# Patient Record
Sex: FEMALE | Race: WHITE
[De-identification: names, ages, dates, MRNs, and addresses within clinical notes are randomized per-mention and may not be internally consistent; named-entity substitution may affect disease eponyms.]

---

## 2017-07-19 ENCOUNTER — RESULT REVIEW (OUTPATIENT)
Age: 57
End: 2017-07-19

## 2021-01-13 ENCOUNTER — APPOINTMENT (OUTPATIENT)
Dept: ORTHOPEDIC SURGERY | Facility: CLINIC | Age: 61
End: 2021-01-13
Payer: COMMERCIAL

## 2021-01-13 VITALS
SYSTOLIC BLOOD PRESSURE: 134 MMHG | DIASTOLIC BLOOD PRESSURE: 91 MMHG | WEIGHT: 116 LBS | BODY MASS INDEX: 19.33 KG/M2 | HEART RATE: 98 BPM | HEIGHT: 65 IN

## 2021-01-13 DIAGNOSIS — M25.562 PAIN IN RIGHT KNEE: ICD-10-CM

## 2021-01-13 DIAGNOSIS — M25.561 PAIN IN RIGHT KNEE: ICD-10-CM

## 2021-01-13 DIAGNOSIS — G89.29 PAIN IN RIGHT KNEE: ICD-10-CM

## 2021-01-13 PROCEDURE — 99203 OFFICE O/P NEW LOW 30 MIN: CPT

## 2021-01-13 PROCEDURE — 99072 ADDL SUPL MATRL&STAF TM PHE: CPT

## 2021-01-13 PROCEDURE — 73564 X-RAY EXAM KNEE 4 OR MORE: CPT | Mod: 50

## 2021-01-13 NOTE — HISTORY OF PRESENT ILLNESS
[de-identified] : 59yo female presents complaining of bilateral knee pain left worse than right for 3 months. No acute injuries. She states she woke up one morning with severe pain unable to walk.  Overall today feeling better. This is worse with using stairs. No injuries. No swelling. Denies numbness tingling\par \par The patient's past medical history, past surgical history, medications, allergies, and social history were reviewed by me today with the patient and documented accordingly. In addition, the patient's family history, which is noncontributory to this visit, was also reviewed.\par

## 2021-01-13 NOTE — PHYSICAL EXAM
[de-identified] : General Exam\par \par Well developed, well nourished\par No apparent distress\par Oriented to person, place, and time\par Mood: Normal\par Affect: Normal\par Balance and coordination: Normal\par Gait: Normal\par \par left knee exam\par \par Skin: Clean, dry, intact\par Inspection: No obvious malalignment, no masses, no swelling, mild effusion.\par Tenderness: no MJLT. No LJLT. No tenderness over the medial and lateral patella facets. No ttp medial/lateral epicondyle, patella tendon, tibial tubercle, pes anserinus, or proximal fibula.\par ROM: 0 to 130° no pain with deep flexion in both knees\par Stability: Stable to varus, valgus, lachman testing. Negative anterior/posterior drawer.\par Additional tests: Negative McMurrays test, Negative patellar grind test. \par Strength: 5/5 Q/H/TA/GS/EHL, no atrophy\par Neuro: In tact to light touch throughout in dp/sp/tib/chanel/saph nerve districutions, DTR's normal\par Pulses: 2+ DP/PT pulses.\par \par right knee exam\par \par Skin: Clean, dry, intact\par Inspection: No obvious malalignment, no masses, no swelling, mild effusion.\par Tenderness: no MJLT. No LJLT. No tenderness over the medial and lateral patella facets. No ttp medial/lateral epicondyle, patella tendon, tibial tubercle, pes anserinus, or proximal fibula.\par ROM: 0 to 130° no pain with deep flexion in both knees\par Stability: Stable to varus, valgus, lachman testing. Negative anterior/posterior drawer.\par Additional tests: Negative McMurrays test, Negative patellar grind test. \par Strength: 5/5 Q/H/TA/GS/EHL, no atrophy\par Neuro: In tact to light touch throughout in dp/sp/tib/chanel/saph nerve districutions, DTR's normal\par Pulses: 2+ DP/PT pulses.\par  [de-identified] : \par The following radiographs were ordered and read by me during this patients visit. I reviewed each radiograph in detail with the patient and discussed the findings as highlighted below. \par \par 4 views of both knees were obtained today. There is mild arthritic changes joint space is maintained\par \par

## 2021-01-13 NOTE — DISCUSSION/SUMMARY
[de-identified] : 60-year-old female with mild arthritis of both knees.I discussed the treatment of degenerative arthritis with the patient at length today. I described the spectrum of treatment from nonoperative modalities to total joint arthroplasty. Noninvasive and nonoperative treatment modalities include weight reduction, activity modification with low impact exercise, PRN use of acetaminophen or anti-inflammatory medication if tolerated, glucosamine/chondroitin supplements, and physical therapy. Further treatments can include corticosteroid injection and the use of hyaluronic acid injections. Definitive treatment can certainly include total joint arthroplasty also. The risks and benefits of each treatment options was discussed and all questions were answered. She is unable to take anti-inflammatory medications given her severe pain she is requesting MRIs of time to further evaluate for intra-articular derangement all questions answered all of her MRI. Tylenol for pain. Ice for swelling

## 2021-03-19 ENCOUNTER — RESULT REVIEW (OUTPATIENT)
Age: 61
End: 2021-03-19

## 2021-10-05 ENCOUNTER — RESULT REVIEW (OUTPATIENT)
Age: 61
End: 2021-10-05

## 2022-01-25 ENCOUNTER — APPOINTMENT (OUTPATIENT)
Dept: ENDOCRINOLOGY | Facility: CLINIC | Age: 62
End: 2022-01-25
Payer: COMMERCIAL

## 2022-01-25 ENCOUNTER — TRANSCRIPTION ENCOUNTER (OUTPATIENT)
Age: 62
End: 2022-01-25

## 2022-01-25 ENCOUNTER — LABORATORY RESULT (OUTPATIENT)
Age: 62
End: 2022-01-25

## 2022-01-25 VITALS
HEART RATE: 87 BPM | DIASTOLIC BLOOD PRESSURE: 84 MMHG | WEIGHT: 112 LBS | SYSTOLIC BLOOD PRESSURE: 123 MMHG | OXYGEN SATURATION: 93 % | HEIGHT: 65 IN | BODY MASS INDEX: 18.66 KG/M2 | TEMPERATURE: 98.1 F

## 2022-01-25 DIAGNOSIS — Z83.3 FAMILY HISTORY OF DIABETES MELLITUS: ICD-10-CM

## 2022-01-25 DIAGNOSIS — Z83.49 FAMILY HISTORY OF OTHER ENDOCRINE, NUTRITIONAL AND METABOLIC DISEASES: ICD-10-CM

## 2022-01-25 DIAGNOSIS — M54.12 RADICULOPATHY, CERVICAL REGION: ICD-10-CM

## 2022-01-25 DIAGNOSIS — Z87.891 PERSONAL HISTORY OF NICOTINE DEPENDENCE: ICD-10-CM

## 2022-01-25 DIAGNOSIS — Z86.59 PERSONAL HISTORY OF OTHER MENTAL AND BEHAVIORAL DISORDERS: ICD-10-CM

## 2022-01-25 DIAGNOSIS — Z82.3 FAMILY HISTORY OF STROKE: ICD-10-CM

## 2022-01-25 DIAGNOSIS — Z87.39 PERSONAL HISTORY OF OTHER DISEASES OF THE MUSCULOSKELETAL SYSTEM AND CONNECTIVE TISSUE: ICD-10-CM

## 2022-01-25 LAB — HBA1C MFR BLD HPLC: 7.1

## 2022-01-25 PROCEDURE — 99205 OFFICE O/P NEW HI 60 MIN: CPT | Mod: 25

## 2022-01-25 PROCEDURE — 36415 COLL VENOUS BLD VENIPUNCTURE: CPT

## 2022-01-25 PROCEDURE — 83036 HEMOGLOBIN GLYCOSYLATED A1C: CPT | Mod: QW

## 2022-01-25 RX ORDER — DULOXETINE HYDROCHLORIDE 30 MG/1
30 CAPSULE, DELAYED RELEASE ORAL
Refills: 0 | Status: ACTIVE | COMMUNITY
Start: 2022-01-25

## 2022-01-25 NOTE — CONSULT LETTER
[Dear  ___] : Dear  [unfilled], [Consult Letter:] : I had the pleasure of evaluating your patient, [unfilled]. [( Thank you for referring [unfilled] for consultation for _____ )] : Thank you for referring [unfilled] for consultation for [unfilled] [Please see my note below.] : Please see my note below. [Consult Closing:] : Thank you very much for allowing me to participate in the care of this patient.  If you have any questions, please do not hesitate to contact me. [Sincerely,] : Sincerely, [FreeTextEntry2] : Alesha Flores MD\par [address]\[place] [address]\par  [FreeTextEntry3] : Emerson Cortés DO\par  of Medicine\par [facility] [employer] [facility]\par

## 2022-01-25 NOTE — ASSESSMENT
[Diabetes Foot Care] : diabetes foot care [Long Term Vascular Complications] : long term vascular complications of diabetes [Carbohydrate Consistent Diet] : carbohydrate consistent diet [Importance of Diet and Exercise] : importance of diet and exercise to improve glycemic control, achieve weight loss and improve cardiovascular health [Self Monitoring of Blood Glucose] : self monitoring of blood glucose [FreeTextEntry1] : 61 y.o. female with h/o Type 2 DM, hyperlipidemia and fatigue.\par \par 1. Type 2 DM- Good control with Hba1c of 7.1% today. Discussed pathophysiology. Reviewed blood glucose and Hba1c goals. Encouraged a carbohydrate consistent diet and exercise. She is not interested in meeting with a RD/CDE. Dietary literature was provided to the patient. For now, will continue Metformin 1,000 mg BID. Discussed adding a GLP-1 agonist or a SGLT-2 inhibitor if diabetes control worsens. Will evaluate for Type 1 DM with a c peptide, anti-FRED, islet antibody and zinc transporter ab. Discussed importance of yearly eye exam and foot care. Will check CMP and urine microalb/cr ratio.\par \par 2. BP is at goal and will monitor for now\par \par 3. Hyperlipidemia- Encouraged a low fat diet and exercise. Will check lipids. Will continue Rosuvastatin 5 mg daily.\par \par 4. Fatigue- Will check CBC, iron studies, TFTs, vitamin B12 with folate and Lyme titer\par \par \par Follow up in 3 to 4 months\par Labs drawn in the office

## 2022-01-25 NOTE — HISTORY OF PRESENT ILLNESS
[FreeTextEntry1] : 61 y.o. female with h/o Type 2 DM diagnosed in 2015 presents for management. She was following with endocrine, Dr. Clark.\par \par She does have a One Touch glucometer but not checking blood glucose levels at home. UTD with ophthalmology (goes yearly) and no retinopathy. No kidney disease. No neuropathy and did see neurology in the past. Self care of feet but does have podiatrist in case of emergency. \par \par Currently taking Metformin 1,000 mg BID. Never met with RD. \par \par Diet:\par Breakfast: yogurt\par Lunch: fruit\par Dinner: starch, protein with salad\par Drinks: water, tea\par Snacks: ice cream sandwich\par \par No exercise but trying to start using treadmill\par \par Also has bulging cervical disc with decrease in balance. \par \par Takes MVI, vitamin C ,magnesium and vitamin B12. \par \par Does c/o fatigue. Weight is stable. No polyuria or polydipsia. No SOB or CP. \par \par In regards to hyperlipidemia, she takes Rosuvastatin 5 mg daily. \par \par She does have family history of autoimmune disease including her sister with Type 1 DM and thyroid disease.

## 2022-01-25 NOTE — PHYSICAL EXAM
[Alert] : alert [No Acute Distress] : no acute distress [Normal Sclera/Conjunctiva] : normal sclera/conjunctiva [EOMI] : extra ocular movement intact [No LAD] : no lymphadenopathy [Thyroid Not Enlarged] : the thyroid was not enlarged [No Thyroid Nodules] : no palpable thyroid nodules [No Respiratory Distress] : no respiratory distress [Clear to Auscultation] : lungs were clear to auscultation bilaterally [Normal S1, S2] : normal S1 and S2 [Regular Rhythm] : with a regular rhythm [No Edema] : no peripheral edema [Normal Bowel Sounds] : normal bowel sounds [Not Tender] : non-tender [Not Distended] : not distended [Soft] : abdomen soft [Normal Anterior Cervical Nodes] : no anterior cervical lymphadenopathy [No Clubbing, Cyanosis] : no clubbing  or cyanosis of the fingernails [No Rash] : no rash [Right foot was examined, including] : right foot ~C was examined, including visual inspection with sensory and pulse exams [Left foot was examined, including] : left foot ~C was examined, including visual inspection with sensory and pulse exams [Normal] : normal [2+] : 2+ in the dorsalis pedis [Normal Reflexes] : deep tendon reflexes were 2+ and symmetric [Normal Affect] : the affect was normal [Normal Mood] : the mood was normal [No Stigmata of Cushings Syndrome] : no stigmata of Cushings Syndrome [Acanthosis Nigricans] : no acanthosis nigricans [Diminished Throughout Both Feet] : normal tactile sensation with monofilament testing throughout both feet [FreeTextEntry1] : bunion [FreeTextEntry5] : bunion

## 2022-01-25 NOTE — REVIEW OF SYSTEMS
[Fatigue] : fatigue [Back Pain] : back pain [Dry Skin] : dry skin [Poor Balance] : poor balance [Negative] : Genitourinary [Hair Loss] : no hair loss [Pain/Numbness of Digits] : no pain/numbness of digits [Polydipsia] : no polydipsia [Cold Intolerance] : no cold intolerance [Heat Intolerance] : no heat intolerance [Easy Bruising] : no tendency for easy bruising [Swelling] : no swelling [FreeTextEntry7] : BMs are variable and UTD with GI

## 2022-01-26 LAB
25(OH)D3 SERPL-MCNC: 53.8 NG/ML
ALBUMIN SERPL ELPH-MCNC: 4.8 G/DL
ALP BLD-CCNC: 72 U/L
ALT SERPL-CCNC: 20 U/L
ANION GAP SERPL CALC-SCNC: 17 MMOL/L
AST SERPL-CCNC: 32 U/L
BASOPHILS # BLD AUTO: 0.05 K/UL
BASOPHILS NFR BLD AUTO: 0.9 %
BILIRUB SERPL-MCNC: 0.4 MG/DL
BUN SERPL-MCNC: 14 MG/DL
C PEPTIDE SERPL-MCNC: 2.8 NG/ML
CALCIUM SERPL-MCNC: 10.6 MG/DL
CHLORIDE SERPL-SCNC: 101 MMOL/L
CHOLEST SERPL-MCNC: 155 MG/DL
CO2 SERPL-SCNC: 20 MMOL/L
CREAT SERPL-MCNC: 0.61 MG/DL
CREAT SPEC-SCNC: 98 MG/DL
EOSINOPHIL # BLD AUTO: 0.17 K/UL
EOSINOPHIL NFR BLD AUTO: 3 %
FERRITIN SERPL-MCNC: 25 NG/ML
FOLATE SERPL-MCNC: 19.9 NG/ML
GLUCOSE SERPL-MCNC: 114 MG/DL
HCT VFR BLD CALC: 40.9 %
HDLC SERPL-MCNC: 64 MG/DL
HGB BLD-MCNC: 12.7 G/DL
IMM GRANULOCYTES NFR BLD AUTO: 0.4 %
IRON SATN MFR SERPL: 17 %
IRON SERPL-MCNC: 59 UG/DL
LDLC SERPL CALC-MCNC: 71 MG/DL
LDLC SERPL DIRECT ASSAY-MCNC: 63 MG/DL
LYMPHOCYTES # BLD AUTO: 1.49 K/UL
LYMPHOCYTES NFR BLD AUTO: 26.4 %
MAN DIFF?: NORMAL
MCHC RBC-ENTMCNC: 28.9 PG
MCHC RBC-ENTMCNC: 31.1 GM/DL
MCV RBC AUTO: 93 FL
MICROALBUMIN 24H UR DL<=1MG/L-MCNC: 2 MG/DL
MICROALBUMIN/CREAT 24H UR-RTO: 21 MG/G
MONOCYTES # BLD AUTO: 0.28 K/UL
MONOCYTES NFR BLD AUTO: 5 %
NEUTROPHILS # BLD AUTO: 3.63 K/UL
NEUTROPHILS NFR BLD AUTO: 64.3 %
NONHDLC SERPL-MCNC: 91 MG/DL
PLATELET # BLD AUTO: 312 K/UL
POTASSIUM SERPL-SCNC: 5.2 MMOL/L
PROT SERPL-MCNC: 7 G/DL
RBC # BLD: 4.4 M/UL
RBC # FLD: 13 %
SODIUM SERPL-SCNC: 139 MMOL/L
TIBC SERPL-MCNC: 350 UG/DL
TRIGL SERPL-MCNC: 98 MG/DL
TSH SERPL-ACNC: 1.07 UIU/ML
UIBC SERPL-MCNC: 291 UG/DL
VIT B12 SERPL-MCNC: >2000 PG/ML
WBC # FLD AUTO: 5.64 K/UL

## 2022-01-29 LAB — PANC ISLET CELL AB SER QL: NORMAL

## 2022-01-30 LAB — GAD65 AB SER-MCNC: 0 NMOL/L

## 2022-02-03 LAB — ZINC TRANSPORTER 8 AB: <15 U/ML

## 2022-04-04 ENCOUNTER — RESULT REVIEW (OUTPATIENT)
Age: 62
End: 2022-04-04

## 2022-05-17 ENCOUNTER — APPOINTMENT (OUTPATIENT)
Dept: ENDOCRINOLOGY | Facility: CLINIC | Age: 62
End: 2022-05-17

## 2022-06-13 ENCOUNTER — APPOINTMENT (OUTPATIENT)
Dept: ENDOCRINOLOGY | Facility: CLINIC | Age: 62
End: 2022-06-13
Payer: COMMERCIAL

## 2022-06-13 VITALS
TEMPERATURE: 97.7 F | DIASTOLIC BLOOD PRESSURE: 83 MMHG | BODY MASS INDEX: 18.33 KG/M2 | SYSTOLIC BLOOD PRESSURE: 117 MMHG | HEART RATE: 84 BPM | OXYGEN SATURATION: 98 % | HEIGHT: 65 IN | WEIGHT: 110 LBS

## 2022-06-13 LAB — HBA1C MFR BLD HPLC: 7.2

## 2022-06-13 PROCEDURE — 83036 HEMOGLOBIN GLYCOSYLATED A1C: CPT | Mod: QW

## 2022-06-13 PROCEDURE — 99214 OFFICE O/P EST MOD 30 MIN: CPT | Mod: 25

## 2022-06-13 NOTE — ASSESSMENT
[Diabetes Foot Care] : diabetes foot care [Long Term Vascular Complications] : long term vascular complications of diabetes [Carbohydrate Consistent Diet] : carbohydrate consistent diet [Importance of Diet and Exercise] : importance of diet and exercise to improve glycemic control, achieve weight loss and improve cardiovascular health [Self Monitoring of Blood Glucose] : self monitoring of blood glucose [FreeTextEntry1] : 61 y.o. female with h/o Type 2 DM, hyperlipidemia and fatigue.\par \par 1. Type 2 DM- Good control with Hba1c of 7.2% today. Discussed pathophysiology. Reviewed blood glucose and Hba1c goals. Encouraged a carbohydrate consistent diet and exercise. She is not interested in meeting with a RD/CDE. For now, will continue Metformin 1,000 mg BID. Discussed adding a GLP-1 agonist or a SGLT-2 inhibitor if diabetes control worsens. Did evaluate for Type 1 DM in 1/2022 with a normal c peptide, anti-FRED, islet antibody and zinc transporter ab. Discussed importance of yearly eye exam and foot care. Normal CMP and urine alb/cr ratio in 1/2022\par \par 2. BP is at goal and will monitor for now\par \par 3. Hyperlipidemia- Encouraged a low fat diet and exercise. Lipids are at goal. Will continue Rosuvastatin 5 mg daily.\par \par 4. Fatigue- Normal CBC, iron studies, TFTs, vitamin B12 with folate and Lyme titer in 1/2022. May be related to sleep. \par \par \par Follow up in 4 to 6 months

## 2022-06-13 NOTE — PHYSICAL EXAM
[Alert] : alert [No Acute Distress] : no acute distress [Normal Sclera/Conjunctiva] : normal sclera/conjunctiva [EOMI] : extra ocular movement intact [No LAD] : no lymphadenopathy [Thyroid Not Enlarged] : the thyroid was not enlarged [No Thyroid Nodules] : no palpable thyroid nodules [No Respiratory Distress] : no respiratory distress [Clear to Auscultation] : lungs were clear to auscultation bilaterally [Normal S1, S2] : normal S1 and S2 [Regular Rhythm] : with a regular rhythm [No Edema] : no peripheral edema [Normal Bowel Sounds] : normal bowel sounds [Not Tender] : non-tender [Not Distended] : not distended [Soft] : abdomen soft [Normal Anterior Cervical Nodes] : no anterior cervical lymphadenopathy [No Stigmata of Cushings Syndrome] : no stigmata of Cushings Syndrome [No Clubbing, Cyanosis] : no clubbing  or cyanosis of the fingernails [No Rash] : no rash [Right foot was examined, including] : right foot ~C was examined, including visual inspection with sensory and pulse exams [Left foot was examined, including] : left foot ~C was examined, including visual inspection with sensory and pulse exams [Normal] : normal [2+] : 2+ in the dorsalis pedis [Normal Reflexes] : deep tendon reflexes were 2+ and symmetric [Normal Affect] : the affect was normal [Normal Mood] : the mood was normal [Acanthosis Nigricans] : no acanthosis nigricans [Diminished Throughout Both Feet] : normal tactile sensation with monofilament testing throughout both feet [FreeTextEntry1] : bunion [FreeTextEntry5] : bunion

## 2022-06-13 NOTE — REVIEW OF SYSTEMS
[Fatigue] : fatigue [Back Pain] : back pain [Poor Balance] : poor balance [Negative] : Genitourinary [Recent Weight Gain (___ Lbs)] : no recent weight gain [Recent Weight Loss (___ Lbs)] : no recent weight loss [Hair Loss] : no hair loss [Pain/Numbness of Digits] : no pain/numbness of digits [Polydipsia] : no polydipsia [Cold Intolerance] : no cold intolerance [Heat Intolerance] : no heat intolerance [Easy Bruising] : no tendency for easy bruising [Swelling] : no swelling [FreeTextEntry7] : BMs are variable but more loose [de-identified] : less dry skin

## 2022-06-13 NOTE — HISTORY OF PRESENT ILLNESS
[FreeTextEntry1] : 61 y.o. female with h/o Type 2 DM diagnosed in 2015 presents for follow up visit. \par \par She does have a One Touch glucometer but not checking blood glucose levels at home. UTD with ophthalmology (goes yearly) and no retinopathy. No kidney disease. No neuropathy and did see neurology in the past. Self care of feet but does have podiatrist in case of emergency. \par \par Currently taking Metformin 1,000 mg BID. Never met with RD. \par \par Diet:\par Breakfast: yogurt\par Lunch: fruit\par Dinner: starch, protein with salad\par Drinks: water, tea\par Snacks: likes sweets\par \par She does use treadmill\par \par Also has bulging cervical disc with decrease in balance. \par \par Takes MVI, vitamin C ,magnesium and vitamin B12. \par \par Does c/o fatigue. Weight is stable. No polyuria or polydipsia. No SOB or CP. \par \par In regards to hyperlipidemia, she takes Rosuvastatin 5 mg daily. \par \par She does have family history of autoimmune disease including her sister with Type 1 DM and thyroid disease.

## 2022-12-12 ENCOUNTER — APPOINTMENT (OUTPATIENT)
Dept: ENDOCRINOLOGY | Facility: CLINIC | Age: 62
End: 2022-12-12

## 2022-12-12 VITALS
WEIGHT: 106 LBS | OXYGEN SATURATION: 97 % | HEIGHT: 65 IN | HEART RATE: 95 BPM | BODY MASS INDEX: 17.66 KG/M2 | SYSTOLIC BLOOD PRESSURE: 130 MMHG | DIASTOLIC BLOOD PRESSURE: 87 MMHG | RESPIRATION RATE: 20 BRPM | TEMPERATURE: 98 F

## 2022-12-12 VITALS — SYSTOLIC BLOOD PRESSURE: 110 MMHG | DIASTOLIC BLOOD PRESSURE: 70 MMHG

## 2022-12-12 LAB — HBA1C MFR BLD HPLC: 6.8

## 2022-12-12 PROCEDURE — 83036 HEMOGLOBIN GLYCOSYLATED A1C: CPT | Mod: QW

## 2022-12-12 PROCEDURE — 36415 COLL VENOUS BLD VENIPUNCTURE: CPT

## 2022-12-12 PROCEDURE — 99214 OFFICE O/P EST MOD 30 MIN: CPT | Mod: 25

## 2022-12-12 RX ORDER — AZITHROMYCIN 250 MG/1
250 TABLET, FILM COATED ORAL
Qty: 6 | Refills: 0 | Status: DISCONTINUED | COMMUNITY
Start: 2022-06-17

## 2022-12-12 NOTE — HISTORY OF PRESENT ILLNESS
[FreeTextEntry1] : 62 y.o. female with h/o Type 2 DM diagnosed in 2015 presents for follow up visit. \par \par She does have a One Touch glucometer and am FS are 160s. UTD with ophthalmology (goes yearly) and no retinopathy. No kidney disease. No neuropathy and did see neurology in the past. Self care of feet but does have podiatrist in case of emergency. \par \par Currently taking Metformin 1,000 mg BID. Never met with RD. \par \par Diet:\par Breakfast: yogurt\par Lunch: fruit\par Dinner: starch, protein with salad\par Drinks: water, tea\par Snacks: likes sweets\par \par She does use treadmill\par \par Also has bulging cervical disc with decrease in balance. \par \par Takes MVI, vitamin C ,magnesium and vitamin B12. \par \par Does c/o fatigue. Weight is down. No polyuria or polydipsia. No SOB or CP. \par \par In regards to hyperlipidemia, she takes Rosuvastatin 5 mg daily. \par \par She does have family history of autoimmune disease including her sister with Type 1 DM and thyroid disease. \par \par In regards to bone health, UTD with DEXA scan and followed by PCP. No falls or fractures. No calcium or vitamin D supplement. She does eat yogurt and some leafy greens.

## 2022-12-12 NOTE — REVIEW OF SYSTEMS
[Fatigue] : fatigue [Recent Weight Loss (___ Lbs)] : recent weight loss: [unfilled] lbs [Back Pain] : back pain [Poor Balance] : poor balance [Negative] : Genitourinary [Recent Weight Gain (___ Lbs)] : no recent weight gain [Hair Loss] : no hair loss [Pain/Numbness of Digits] : no pain/numbness of digits [Polydipsia] : no polydipsia [Cold Intolerance] : no cold intolerance [Heat Intolerance] : no heat intolerance [Easy Bruising] : no tendency for easy bruising [Swelling] : no swelling [FreeTextEntry7] : BMs are variable but more loose [de-identified] : less dry skin

## 2022-12-12 NOTE — ASSESSMENT
[Diabetes Foot Care] : diabetes foot care [Long Term Vascular Complications] : long term vascular complications of diabetes [Carbohydrate Consistent Diet] : carbohydrate consistent diet [Importance of Diet and Exercise] : importance of diet and exercise to improve glycemic control, achieve weight loss and improve cardiovascular health [Self Monitoring of Blood Glucose] : self monitoring of blood glucose [FreeTextEntry1] : 62 y.o. female with h/o Type 2 DM, hyperlipidemia and fatigue.\par \par 1. Type 2 DM- Good control with Hba1c of 6.8% today. Discussed pathophysiology. Reviewed blood glucose and Hba1c goals. Encouraged a carbohydrate consistent diet and exercise. For now, will continue Metformin 1,000 mg BID. Discussed adding a GLP-1 agonist or a SGLT-2 inhibitor if diabetes control worsens. Did evaluate for Type 1 DM in 1/2022 with a normal c peptide, anti-FRED, islet antibody and zinc transporter ab. Discussed importance of yearly eye exam and foot care. Normal CMP and urine alb/cr ratio in 1/2022 and will repeat now. \par \par 2. BP is at goal and will monitor for now\par \par 3. Hyperlipidemia- Encouraged a low fat diet and exercise. Will check lipids. Will continue Rosuvastatin 5 mg daily.\par \par 4. Fatigue- Will check CBC, iron studies, TFTs, and vitamin B12 with folate. May be related to sleep. \par \par 5. Bone health- Encouraged patient to have most recent DEXA scan forwarded to me for review. Encouraged weight bearing activity. Discussed appropriate calcium and vitamin D intake. \par \par Follow up in 4 months\par Labs drawn in the office today

## 2022-12-13 ENCOUNTER — TRANSCRIPTION ENCOUNTER (OUTPATIENT)
Age: 62
End: 2022-12-13

## 2022-12-13 LAB
25(OH)D3 SERPL-MCNC: 41.6 NG/ML
ALBUMIN SERPL ELPH-MCNC: 4.5 G/DL
ALP BLD-CCNC: 97 U/L
ALT SERPL-CCNC: 11 U/L
ANION GAP SERPL CALC-SCNC: 13 MMOL/L
AST SERPL-CCNC: 16 U/L
BASOPHILS # BLD AUTO: 0.04 K/UL
BASOPHILS NFR BLD AUTO: 0.6 %
BILIRUB SERPL-MCNC: 0.3 MG/DL
BUN SERPL-MCNC: 17 MG/DL
CALCIUM SERPL-MCNC: 10.1 MG/DL
CHLORIDE SERPL-SCNC: 102 MMOL/L
CHOLEST SERPL-MCNC: 135 MG/DL
CO2 SERPL-SCNC: 25 MMOL/L
CREAT SERPL-MCNC: 0.77 MG/DL
CREAT SPEC-SCNC: 191 MG/DL
EGFR: 88 ML/MIN/1.73M2
EOSINOPHIL # BLD AUTO: 0.2 K/UL
EOSINOPHIL NFR BLD AUTO: 3.2 %
FERRITIN SERPL-MCNC: 15 NG/ML
FOLATE SERPL-MCNC: 6.2 NG/ML
GLUCOSE SERPL-MCNC: 106 MG/DL
HCT VFR BLD CALC: 37.3 %
HDLC SERPL-MCNC: 68 MG/DL
HGB BLD-MCNC: 11.9 G/DL
IMM GRANULOCYTES NFR BLD AUTO: 0.2 %
IRON SATN MFR SERPL: 15 %
IRON SERPL-MCNC: 55 UG/DL
LDLC SERPL CALC-MCNC: 47 MG/DL
LYMPHOCYTES # BLD AUTO: 1.82 K/UL
LYMPHOCYTES NFR BLD AUTO: 28.7 %
MAN DIFF?: NORMAL
MCHC RBC-ENTMCNC: 28.2 PG
MCHC RBC-ENTMCNC: 31.9 GM/DL
MCV RBC AUTO: 88.4 FL
MICROALBUMIN 24H UR DL<=1MG/L-MCNC: 3.2 MG/DL
MICROALBUMIN/CREAT 24H UR-RTO: 17 MG/G
MONOCYTES # BLD AUTO: 0.42 K/UL
MONOCYTES NFR BLD AUTO: 6.6 %
NEUTROPHILS # BLD AUTO: 3.85 K/UL
NEUTROPHILS NFR BLD AUTO: 60.7 %
NONHDLC SERPL-MCNC: 67 MG/DL
PLATELET # BLD AUTO: 360 K/UL
POTASSIUM SERPL-SCNC: 5 MMOL/L
PROT SERPL-MCNC: 6.9 G/DL
RBC # BLD: 4.22 M/UL
RBC # FLD: 12.6 %
SODIUM SERPL-SCNC: 140 MMOL/L
TIBC SERPL-MCNC: 370 UG/DL
TRIGL SERPL-MCNC: 99 MG/DL
TSH SERPL-ACNC: 1.4 UIU/ML
UIBC SERPL-MCNC: 315 UG/DL
VIT B12 SERPL-MCNC: 1732 PG/ML
WBC # FLD AUTO: 6.34 K/UL

## 2022-12-14 ENCOUNTER — TRANSCRIPTION ENCOUNTER (OUTPATIENT)
Age: 62
End: 2022-12-14

## 2022-12-28 ENCOUNTER — TRANSCRIPTION ENCOUNTER (OUTPATIENT)
Age: 62
End: 2022-12-28

## 2023-01-12 ENCOUNTER — TRANSCRIPTION ENCOUNTER (OUTPATIENT)
Age: 63
End: 2023-01-12

## 2023-01-25 RX ORDER — ZOLEDRONIC ACID 5 MG/100ML
5 INJECTION INTRAVENOUS
Qty: 0 | Refills: 0 | Status: COMPLETED | OUTPATIENT
Start: 2023-01-25 | End: 1900-01-01

## 2023-02-08 ENCOUNTER — TRANSCRIPTION ENCOUNTER (OUTPATIENT)
Age: 63
End: 2023-02-08

## 2023-02-13 ENCOUNTER — TRANSCRIPTION ENCOUNTER (OUTPATIENT)
Age: 63
End: 2023-02-13

## 2023-02-14 ENCOUNTER — TRANSCRIPTION ENCOUNTER (OUTPATIENT)
Age: 63
End: 2023-02-14

## 2023-02-16 ENCOUNTER — TRANSCRIPTION ENCOUNTER (OUTPATIENT)
Age: 63
End: 2023-02-16

## 2023-02-16 RX ORDER — ZOLEDRONIC ACID 5 MG/100ML
5 INJECTION INTRAVENOUS
Qty: 1 | Refills: 0 | Status: ACTIVE | OUTPATIENT
Start: 2023-01-13

## 2023-02-22 ENCOUNTER — APPOINTMENT (OUTPATIENT)
Dept: RHEUMATOLOGY | Facility: CLINIC | Age: 63
End: 2023-02-22
Payer: COMMERCIAL

## 2023-02-22 VITALS
DIASTOLIC BLOOD PRESSURE: 81 MMHG | SYSTOLIC BLOOD PRESSURE: 118 MMHG | RESPIRATION RATE: 16 BRPM | OXYGEN SATURATION: 97 % | HEART RATE: 87 BPM | TEMPERATURE: 98.7 F

## 2023-02-22 VITALS
RESPIRATION RATE: 16 BRPM | SYSTOLIC BLOOD PRESSURE: 108 MMHG | OXYGEN SATURATION: 99 % | DIASTOLIC BLOOD PRESSURE: 75 MMHG | HEART RATE: 83 BPM

## 2023-02-22 PROCEDURE — 96374 THER/PROPH/DIAG INJ IV PUSH: CPT | Mod: 59

## 2023-02-22 RX ORDER — ZOLEDRONIC ACID 5 MG/100ML
5 INJECTION INTRAVENOUS
Qty: 0 | Refills: 0 | Status: COMPLETED | OUTPATIENT
Start: 2023-02-16

## 2023-02-22 NOTE — HISTORY OF PRESENT ILLNESS
[Denies] : Denies [No] : No [Yes] : Yes [Declined] : Declined [Left upper extremity] : Left upper extremity [24g] : 24g [Start Time: ___] : Medication Start Time: [unfilled] [End Time: ___] : Medication End Time: [unfilled] [IV discontinued. Intact. No signs or symptoms of IV complications noted. Time: ___] : IV discontinued. Intact. No signs or symptoms of IV complications noted. Time: [unfilled] [Patient  instructed to seek medical attention with signs and symptoms of adverse effects] : Patient  instructed to seek medical attention with signs and symptoms of adverse effects [Patient left unit in no acute distress] : Patient left unit in no acute distress [Medications administered as ordered and tolerated well.] : Medications administered as ordered and tolerated well. [de-identified] : Median cubital vein [de-identified] : Patient presents for Reclast infusion, doing well overall. Patient denies any recent infection or antibiotic use. Patient given discharge instructions post infusion. Patient tolerated infusion well.

## 2023-03-11 ENCOUNTER — OFFICE (OUTPATIENT)
Dept: URBAN - METROPOLITAN AREA CLINIC 35 | Facility: CLINIC | Age: 63
Setting detail: OPHTHALMOLOGY
End: 2023-03-11

## 2023-03-11 PROCEDURE — RADIESSE RADIESSE: Performed by: OPHTHALMOLOGY

## 2023-03-11 PROCEDURE — RESTYLANE RESTYLANE: Performed by: OPHTHALMOLOGY

## 2023-03-11 ASSESSMENT — REFRACTION_AUTOREFRACTION
OS_SPHERE: -3.00
OD_SPHERE: -1.25
OS_AXIS: 131
OD_CYLINDER: -1.25
OS_CYLINDER: -0.50
OD_AXIS: 084

## 2023-03-11 ASSESSMENT — VISUAL ACUITY
OS_BCVA: 20/20-3
OD_BCVA: 20/20-3

## 2023-03-11 ASSESSMENT — LID EXAM ASSESSMENTS
OS_BLEPHARITIS: 1+
OD_BLEPHARITIS: 1+

## 2023-03-11 ASSESSMENT — AXIALLENGTH_DERIVED
OS_AL: 23.0789
OD_AL: 23.0935

## 2023-03-11 ASSESSMENT — KERATOMETRY
OS_K2POWER_DIOPTERS: 48.75
OD_K1POWER_DIOPTERS: 46.50
OS_AXISANGLE_DEGREES: 052
OS_K1POWER_DIOPTERS: 48.00
OD_K2POWER_DIOPTERS: 47.25
OD_AXISANGLE_DEGREES: 164
METHOD_AUTO_MANUAL: AUTO

## 2023-03-11 ASSESSMENT — CONFRONTATIONAL VISUAL FIELD TEST (CVF)
OS_FINDINGS: FULL
OD_FINDINGS: FULL

## 2023-03-11 ASSESSMENT — LID POSITION - COMMENTS: OD_COMMENTS: INCISIONS INTACT

## 2023-03-11 ASSESSMENT — SUPERFICIAL PUNCTATE KERATITIS (SPK)
OS_SPK: T 1+
OD_SPK: T

## 2023-03-11 ASSESSMENT — LID POSITION - DERMATOCHALASIS
OS_DERMATOCHALASIS: ABSENT
OD_DERMATOCHALASIS: ABSENT

## 2023-03-11 ASSESSMENT — SPHEQUIV_DERIVED
OD_SPHEQUIV: -1.875
OS_SPHEQUIV: -3.25

## 2023-04-17 ENCOUNTER — APPOINTMENT (OUTPATIENT)
Dept: ENDOCRINOLOGY | Facility: CLINIC | Age: 63
End: 2023-04-17
Payer: COMMERCIAL

## 2023-04-17 VITALS
TEMPERATURE: 98 F | WEIGHT: 106 LBS | OXYGEN SATURATION: 99 % | DIASTOLIC BLOOD PRESSURE: 82 MMHG | BODY MASS INDEX: 17.66 KG/M2 | RESPIRATION RATE: 16 BRPM | HEIGHT: 65 IN | SYSTOLIC BLOOD PRESSURE: 128 MMHG | HEART RATE: 81 BPM

## 2023-04-17 LAB — HBA1C MFR BLD HPLC: 6.9

## 2023-04-17 PROCEDURE — 83036 HEMOGLOBIN GLYCOSYLATED A1C: CPT | Mod: QW

## 2023-04-17 PROCEDURE — 99214 OFFICE O/P EST MOD 30 MIN: CPT | Mod: 25

## 2023-04-17 RX ORDER — HYDROCODONE BITARTRATE AND HOMATROPINE METHYLBROMIDE 1.5; 5 MG/5ML; MG/5ML
5-1.5 SOLUTION ORAL
Qty: 120 | Refills: 0 | Status: DISCONTINUED | COMMUNITY
Start: 2022-12-15

## 2023-04-17 RX ORDER — SODIUM PICOSULFATE, MAGNESIUM OXIDE, AND ANHYDROUS CITRIC ACID 10; 3.5; 12 MG/160ML; G/160ML; G/160ML
10-3.5-12 MG-GM LIQUID ORAL
Qty: 320 | Refills: 0 | Status: DISCONTINUED | COMMUNITY
Start: 2023-01-13

## 2023-04-17 NOTE — HISTORY OF PRESENT ILLNESS
[FreeTextEntry1] : 62 y.o. female with h/o Type 2 DM diagnosed in 2015, hyperlipidemia and osteoporosis presents for follow up visit. \par \par She does have a One Touch glucometer and am FS are 100s to 180. UTD with ophthalmology (goes yearly) and no retinopathy. No kidney disease. No neuropathy and did see neurology in the past. Self care of feet but does have podiatrist in case of emergency. Reports restless legs. \par \par Currently taking Metformin 1,000 mg BID. Never met with RD. UTD with colonoscopy. \par \par Diet:\par Breakfast: yogurt\par Lunch: fruit\par Dinner: starch, protein with salad\par Drinks: water, tea\par Snacks: likes sweets\par \par She does use treadmill\par \par Also has bulging cervical disc with decrease in balance. \par \par Takes MVI, vitamin C ,magnesium and vitamin B12. \par \par Does c/o fatigue. Weight is down. No polyuria or polydipsia. No SOB or CP. \par \par In regards to hyperlipidemia, she takes Rosuvastatin 5 mg daily. \par \par She does have family history of autoimmune disease including her sister with Type 1 DM and thyroid disease. \par \par In regards to osteoporosis, no falls or fractures. No calcium or vitamin D supplement. She does eat yogurt and some leafy greens. UTD with dentist. \par \par S/p IV Reclast infusion on 2/22/23\par \par DEXA scan on 3/25/22 shows spine -3.0, left femoral neck -1.6 and total hip -1.8. \par

## 2023-04-17 NOTE — REVIEW OF SYSTEMS
[Fatigue] : fatigue [Back Pain] : back pain [Poor Balance] : poor balance [Negative] : Genitourinary [Recent Weight Gain (___ Lbs)] : no recent weight gain [Recent Weight Loss (___ Lbs)] : no recent weight loss [Hair Loss] : no hair loss [Pain/Numbness of Digits] : no pain/numbness of digits [Polydipsia] : no polydipsia [Cold Intolerance] : no cold intolerance [Heat Intolerance] : no heat intolerance [Easy Bruising] : no tendency for easy bruising [Swelling] : no swelling [FreeTextEntry7] : BMs are variable but more loose [de-identified] : less dry skin

## 2023-04-17 NOTE — ASSESSMENT
[Diabetes Foot Care] : diabetes foot care [Long Term Vascular Complications] : long term vascular complications of diabetes [Carbohydrate Consistent Diet] : carbohydrate consistent diet [Importance of Diet and Exercise] : importance of diet and exercise to improve glycemic control, achieve weight loss and improve cardiovascular health [Self Monitoring of Blood Glucose] : self monitoring of blood glucose [Bisphosphonate Therapy] : Risks and benefits of bisphosphonate therapy were  discussed with the patient including gastroesophageal irritation, osteonecrosis of the jaw, and atypical femur fractures, and acute phase reaction [FreeTextEntry1] : 62 y.o. female with h/o Type 2 DM, hyperlipidemia and fatigue.\par \par 1. Type 2 DM- Good control with Hba1c of 6.9% today. Discussed pathophysiology. Reviewed blood glucose and Hba1c goals. Encouraged a carbohydrate consistent diet and exercise. For now, will continue Metformin 1,000 mg BID. Discussed adding a GLP-1 agonist or a SGLT-2 inhibitor if diabetes control worsens. Did evaluate for Type 1 DM in 1/2022 with a normal c peptide, anti-FRED, islet antibody and zinc transporter ab. Discussed importance of yearly eye exam and foot care. Normal CMP and urine alb/cr ratio in 12/2022.\par \par 2. BP is at goal and will monitor for now\par \par 3. Hyperlipidemia- Encouraged a low fat diet and exercise. Lipids are at goal. Will continue Rosuvastatin 5 mg daily.\par \par 4. Osteoporosis- DEXA scan from March 2022 was reviewed. Patient is at increased risk of fracture. Recommend medical therapy and s/p IV Reclast infusion in February 2023.  Encouraged weight bearing activity. Discussed appropriate calcium and vitamin D intake. Will repeat DEXA scan in 2024. \par \par Follow up in 4 months\par

## 2023-04-17 NOTE — PHYSICAL EXAM
[Alert] : alert [No Acute Distress] : no acute distress [Normal Sclera/Conjunctiva] : normal sclera/conjunctiva [EOMI] : extra ocular movement intact [No LAD] : no lymphadenopathy [Thyroid Not Enlarged] : the thyroid was not enlarged [No Thyroid Nodules] : no palpable thyroid nodules [No Respiratory Distress] : no respiratory distress [Clear to Auscultation] : lungs were clear to auscultation bilaterally [Normal S1, S2] : normal S1 and S2 [Regular Rhythm] : with a regular rhythm [No Edema] : no peripheral edema [Normal Bowel Sounds] : normal bowel sounds [Not Tender] : non-tender [Not Distended] : not distended [Soft] : abdomen soft [Normal Anterior Cervical Nodes] : no anterior cervical lymphadenopathy [No Stigmata of Cushings Syndrome] : no stigmata of Cushings Syndrome [No Clubbing, Cyanosis] : no clubbing  or cyanosis of the fingernails [No Rash] : no rash [Right foot was examined, including] : right foot ~C was examined, including visual inspection with sensory and pulse exams [Left foot was examined, including] : left foot ~C was examined, including visual inspection with sensory and pulse exams [Normal] : normal [2+] : 2+ in the dorsalis pedis [Normal Reflexes] : deep tendon reflexes were 2+ and symmetric [Normal Affect] : the affect was normal [Normal Mood] : the mood was normal [No Spinal Tenderness] : no spinal tenderness [Kyphosis] : no kyphosis present [Acanthosis Nigricans] : no acanthosis nigricans [FreeTextEntry1] : bunion [FreeTextEntry5] : bunion

## 2023-05-03 ENCOUNTER — OFFICE (OUTPATIENT)
Dept: URBAN - METROPOLITAN AREA CLINIC 35 | Facility: CLINIC | Age: 63
Setting detail: OPHTHALMOLOGY
End: 2023-05-03
Payer: COMMERCIAL

## 2023-05-03 DIAGNOSIS — E11.9: ICD-10-CM

## 2023-05-03 DIAGNOSIS — H02.831: ICD-10-CM

## 2023-05-03 DIAGNOSIS — H35.3131: ICD-10-CM

## 2023-05-03 DIAGNOSIS — H52.13: ICD-10-CM

## 2023-05-03 DIAGNOSIS — H52.12: ICD-10-CM

## 2023-05-03 DIAGNOSIS — H02.834: ICD-10-CM

## 2023-05-03 DIAGNOSIS — H01.005: ICD-10-CM

## 2023-05-03 DIAGNOSIS — H16.223: ICD-10-CM

## 2023-05-03 DIAGNOSIS — H01.002: ICD-10-CM

## 2023-05-03 DIAGNOSIS — H25.13: ICD-10-CM

## 2023-05-03 DIAGNOSIS — H43.391: ICD-10-CM

## 2023-05-03 PROCEDURE — 92014 COMPRE OPH EXAM EST PT 1/>: CPT | Performed by: OPHTHALMOLOGY

## 2023-05-03 PROCEDURE — 92015 DETERMINE REFRACTIVE STATE: CPT | Performed by: OPHTHALMOLOGY

## 2023-05-03 PROCEDURE — 92250 FUNDUS PHOTOGRAPHY W/I&R: CPT | Performed by: OPHTHALMOLOGY

## 2023-05-03 ASSESSMENT — LID POSITION - COMMENTS: OD_COMMENTS: INCISIONS INTACT

## 2023-05-03 ASSESSMENT — REFRACTION_CURRENTRX
OD_VPRISM_DIRECTION: SV
OD_SPHERE: -1.00
OD_CYLINDER: -1.25
OS_AXIS: 131
OS_CYLINDER: -0.50
OS_OVR_VA: 20/
OS_VPRISM_DIRECTION: SV
OD_AXIS: 097
OS_SPHERE: -2.75
OD_OVR_VA: 20/

## 2023-05-03 ASSESSMENT — REFRACTION_MANIFEST
OD_AXIS: 085
OD_SPHERE: -1.00
OS_AXIS: 115
OS_CYLINDER: -0.50
OS_SPHERE: -3.25
OD_VA1: 20/20-1
OD_CYLINDER: -2.00
OS_CYLINDER: -0.50
OD_SPHERE: -1.00
OS_VA1: 20/25
OS_SPHERE: -3.00
OD_AXIS: 080
OD_CYLINDER: -1.50
OS_AXIS: 115

## 2023-05-03 ASSESSMENT — SUPERFICIAL PUNCTATE KERATITIS (SPK)
OS_SPK: T 1+
OD_SPK: T

## 2023-05-03 ASSESSMENT — TONOMETRY
OD_IOP_MMHG: 14
OS_IOP_MMHG: 14

## 2023-05-03 ASSESSMENT — SPHEQUIV_DERIVED
OS_SPHEQUIV: -3.5
OD_SPHEQUIV: -2
OS_SPHEQUIV: -3.5
OS_SPHEQUIV: -3.25
OD_SPHEQUIV: -2
OD_SPHEQUIV: -1.75

## 2023-05-03 ASSESSMENT — CONFRONTATIONAL VISUAL FIELD TEST (CVF)
OS_FINDINGS: FULL
OD_FINDINGS: FULL

## 2023-05-03 ASSESSMENT — LID POSITION - DERMATOCHALASIS
OS_DERMATOCHALASIS: ABSENT
OD_DERMATOCHALASIS: ABSENT

## 2023-05-03 ASSESSMENT — LID EXAM ASSESSMENTS
OD_BLEPHARITIS: 1+
OS_BLEPHARITIS: 1+

## 2023-05-03 ASSESSMENT — AXIALLENGTH_DERIVED
OS_AL: 23.1283
OS_AL: 23.1283
OD_AL: 23.0469
OD_AL: 23.1403
OS_AL: 23.035
OD_AL: 23.1403

## 2023-05-03 ASSESSMENT — KERATOMETRY
OD_K1POWER_DIOPTERS: 46.00
OS_K2POWER_DIOPTERS: 48.75
OD_AXISANGLE_DEGREES: 169
OD_K2POWER_DIOPTERS: 47.75
OS_K1POWER_DIOPTERS: 48.25
OS_AXISANGLE_DEGREES: 045
METHOD_AUTO_MANUAL: AUTO

## 2023-05-03 ASSESSMENT — VISUAL ACUITY
OS_BCVA: 20/20
OD_BCVA: 20/20

## 2023-05-03 ASSESSMENT — REFRACTION_AUTOREFRACTION
OS_CYLINDER: -0.50
OS_SPHERE: -3.25
OD_AXIS: 081
OS_AXIS: 115
OD_CYLINDER: -2.00
OD_SPHERE: -1.00

## 2023-08-29 ENCOUNTER — APPOINTMENT (OUTPATIENT)
Dept: ENDOCRINOLOGY | Facility: CLINIC | Age: 63
End: 2023-08-29
Payer: COMMERCIAL

## 2023-08-29 VITALS
OXYGEN SATURATION: 97 % | WEIGHT: 103 LBS | SYSTOLIC BLOOD PRESSURE: 125 MMHG | HEART RATE: 76 BPM | BODY MASS INDEX: 17.16 KG/M2 | TEMPERATURE: 97.7 F | HEIGHT: 65 IN | DIASTOLIC BLOOD PRESSURE: 85 MMHG

## 2023-08-29 DIAGNOSIS — R53.83 OTHER FATIGUE: ICD-10-CM

## 2023-08-29 LAB — HBA1C MFR BLD HPLC: 6.8

## 2023-08-29 PROCEDURE — 99214 OFFICE O/P EST MOD 30 MIN: CPT | Mod: 25

## 2023-08-29 PROCEDURE — 83036 HEMOGLOBIN GLYCOSYLATED A1C: CPT | Mod: QW

## 2023-08-29 NOTE — REVIEW OF SYSTEMS
[Fatigue] : fatigue [Back Pain] : back pain [Poor Balance] : poor balance [Negative] : Genitourinary [Recent Weight Gain (___ Lbs)] : no recent weight gain [Recent Weight Loss (___ Lbs)] : no recent weight loss [Hair Loss] : no hair loss [Pain/Numbness of Digits] : no pain/numbness of digits [Polydipsia] : no polydipsia [Cold Intolerance] : no cold intolerance [Heat Intolerance] : no heat intolerance [Easy Bruising] : no tendency for easy bruising [Swelling] : no swelling [FreeTextEntry7] : BMs are variable but more loose [de-identified] : less dry skin

## 2023-08-29 NOTE — HISTORY OF PRESENT ILLNESS
[FreeTextEntry1] : 62 y.o. female with h/o Type 2 DM diagnosed in 2015, hyperlipidemia and osteoporosis presents for follow up visit.   She does have a One Touch glucometer and am FS once weekly are 100s to 180. UTD with ophthalmology (May 2023) and no retinopathy. No kidney disease. No neuropathy and did see neurology in the past. Self-care of feet but does have podiatrist in case of emergency. Reports restless legs.   Currently taking Metformin 1,000 mg BID. Never met with RD. UTD with colonoscopy.   Diet: Breakfast: yogurt Lunch: fruit Dinner: starch, protein with salad (pork chop, baked potato and vegetable) Drinks: water, tea Snacks: likes sweets and sometimes has popcorn  She does keep active with grandkids but no exercise.   Also has bulging cervical disc with decrease in balance.   Takes MVI, vitamin C, magnesium and vitamin B12.   Does c/o fatigue. Weight is down. No polyuria or polydipsia. No SOB or CP.   In regard to hyperlipidemia, she takes Rosuvastatin 5 mg daily.   She does have family history of autoimmune disease including her sister with Type 1 DM and thyroid disease.   In regard to osteoporosis, no falls or fractures. No calcium or vitamin D supplement. She does eat yogurt and some leafy greens. UTD with dentist.   S/p IV Reclast infusion on 2/22/23  DEXA scan on 3/25/22 shows spine -3.0, left femoral neck -1.6 and total hip -1.8.

## 2023-08-29 NOTE — PHYSICAL EXAM
[Alert] : alert [No Acute Distress] : no acute distress [Normal Sclera/Conjunctiva] : normal sclera/conjunctiva [EOMI] : extra ocular movement intact [No LAD] : no lymphadenopathy [Thyroid Not Enlarged] : the thyroid was not enlarged [No Thyroid Nodules] : no palpable thyroid nodules [No Respiratory Distress] : no respiratory distress [Clear to Auscultation] : lungs were clear to auscultation bilaterally [Normal S1, S2] : normal S1 and S2 [Regular Rhythm] : with a regular rhythm [No Edema] : no peripheral edema [Normal Bowel Sounds] : normal bowel sounds [Not Tender] : non-tender [Not Distended] : not distended [Soft] : abdomen soft [Normal Anterior Cervical Nodes] : no anterior cervical lymphadenopathy [No Spinal Tenderness] : no spinal tenderness [No Stigmata of Cushings Syndrome] : no stigmata of Cushings Syndrome [No Clubbing, Cyanosis] : no clubbing  or cyanosis of the fingernails [No Rash] : no rash [Right foot was examined, including] : right foot ~C was examined, including visual inspection with sensory and pulse exams [Left foot was examined, including] : left foot ~C was examined, including visual inspection with sensory and pulse exams [Normal] : normal [2+] : 2+ in the dorsalis pedis [Normal Reflexes] : deep tendon reflexes were 2+ and symmetric [Normal Affect] : the affect was normal [Normal Mood] : the mood was normal [Kyphosis] : no kyphosis present [Acanthosis Nigricans] : no acanthosis nigricans [Diminished Throughout Both Feet] : normal tactile sensation with monofilament testing throughout both feet [FreeTextEntry1] : bunion [FreeTextEntry5] : bunion

## 2023-08-29 NOTE — ASSESSMENT
[Diabetes Foot Care] : diabetes foot care [Long Term Vascular Complications] : long term vascular complications of diabetes [Carbohydrate Consistent Diet] : carbohydrate consistent diet [Importance of Diet and Exercise] : importance of diet and exercise to improve glycemic control, achieve weight loss and improve cardiovascular health [Self Monitoring of Blood Glucose] : self monitoring of blood glucose [Bisphosphonate Therapy] : Risks and benefits of bisphosphonate therapy were  discussed with the patient including gastroesophageal irritation, osteonecrosis of the jaw, and atypical femur fractures, and acute phase reaction [FreeTextEntry1] : 62 y.o. female with h/o Type 2 DM, hyperlipidemia and fatigue.  1. Type 2 DM- Good control with Hba1c of 6.8% today. Discussed pathophysiology. Reviewed blood glucose and Hba1c goals. Encouraged a carbohydrate consistent diet and exercise. For now, will continue Metformin 1,000 mg BID. Discussed adding a GLP-1 agonist or a SGLT-2 inhibitor if diabetes control worsens. Did evaluate for Type 1 DM in 1/2022 with a normal c peptide, anti-FRED, islet antibody and zinc transporter ab. Discussed importance of yearly eye exam and foot care. Normal CMP and urine alb/cr ratio in 12/2022 and will recheck today.  2. BP is at goal and will monitor for now  3. Hyperlipidemia- Encouraged a low-fat diet and exercise. Will check lipids. Will continue Rosuvastatin 5 mg daily.  4. Osteoporosis- DEXA scan from March 2022 was reviewed. Patient is at increased risk of fracture. Recommend medical therapy and s/p IV Reclast infusion in February 2023.  Encouraged weight bearing activity. Discussed appropriate calcium and vitamin D intake. Will repeat DEXA scan in March 2024.   Follow up in 4 to 6 months.

## 2023-08-30 LAB
25(OH)D3 SERPL-MCNC: 27.5 NG/ML
ALBUMIN SERPL ELPH-MCNC: 4.5 G/DL
ALP BLD-CCNC: 61 U/L
ALT SERPL-CCNC: 11 U/L
ANION GAP SERPL CALC-SCNC: 13 MMOL/L
AST SERPL-CCNC: 14 U/L
BILIRUB SERPL-MCNC: 0.4 MG/DL
BUN SERPL-MCNC: 13 MG/DL
CALCIUM SERPL-MCNC: 9.2 MG/DL
CHLORIDE SERPL-SCNC: 101 MMOL/L
CHOLEST SERPL-MCNC: 141 MG/DL
CO2 SERPL-SCNC: 24 MMOL/L
CREAT SERPL-MCNC: 0.71 MG/DL
CREAT SPEC-SCNC: 60 MG/DL
EGFR: 97 ML/MIN/1.73M2
FERRITIN SERPL-MCNC: 15 NG/ML
FOLATE SERPL-MCNC: 3.9 NG/ML
GLUCOSE SERPL-MCNC: 110 MG/DL
HCT VFR BLD CALC: 37.9 %
HDLC SERPL-MCNC: 69 MG/DL
HGB BLD-MCNC: 12 G/DL
IRON SATN MFR SERPL: 16 %
IRON SERPL-MCNC: 54 UG/DL
LDLC SERPL CALC-MCNC: 56 MG/DL
MCHC RBC-ENTMCNC: 28.4 PG
MCHC RBC-ENTMCNC: 31.7 GM/DL
MCV RBC AUTO: 89.6 FL
MICROALBUMIN 24H UR DL<=1MG/L-MCNC: <1.2 MG/DL
MICROALBUMIN/CREAT 24H UR-RTO: NORMAL MG/G
NONHDLC SERPL-MCNC: 72 MG/DL
PLATELET # BLD AUTO: 313 K/UL
POTASSIUM SERPL-SCNC: 4.9 MMOL/L
PROT SERPL-MCNC: 6.6 G/DL
RBC # BLD: 4.23 M/UL
RBC # FLD: 13 %
SODIUM SERPL-SCNC: 138 MMOL/L
TIBC SERPL-MCNC: 338 UG/DL
TRIGL SERPL-MCNC: 88 MG/DL
TSH SERPL-ACNC: 0.7 UIU/ML
UIBC SERPL-MCNC: 283 UG/DL
VIT B12 SERPL-MCNC: 445 PG/ML
WBC # FLD AUTO: 5.66 K/UL

## 2023-12-26 ENCOUNTER — TRANSCRIPTION ENCOUNTER (OUTPATIENT)
Age: 63
End: 2023-12-26

## 2023-12-27 ENCOUNTER — TRANSCRIPTION ENCOUNTER (OUTPATIENT)
Age: 63
End: 2023-12-27

## 2024-02-01 ENCOUNTER — TRANSCRIPTION ENCOUNTER (OUTPATIENT)
Age: 64
End: 2024-02-01

## 2024-02-02 ENCOUNTER — TRANSCRIPTION ENCOUNTER (OUTPATIENT)
Age: 64
End: 2024-02-02

## 2024-02-12 ENCOUNTER — TRANSCRIPTION ENCOUNTER (OUTPATIENT)
Age: 64
End: 2024-02-12

## 2024-02-13 ENCOUNTER — TRANSCRIPTION ENCOUNTER (OUTPATIENT)
Age: 64
End: 2024-02-13

## 2024-02-14 ENCOUNTER — TRANSCRIPTION ENCOUNTER (OUTPATIENT)
Age: 64
End: 2024-02-14

## 2024-02-27 ENCOUNTER — APPOINTMENT (OUTPATIENT)
Dept: ENDOCRINOLOGY | Facility: CLINIC | Age: 64
End: 2024-02-27

## 2024-04-16 ENCOUNTER — APPOINTMENT (OUTPATIENT)
Dept: ENDOCRINOLOGY | Facility: CLINIC | Age: 64
End: 2024-04-16
Payer: COMMERCIAL

## 2024-04-16 VITALS
WEIGHT: 93 LBS | HEART RATE: 83 BPM | BODY MASS INDEX: 15.49 KG/M2 | SYSTOLIC BLOOD PRESSURE: 142 MMHG | OXYGEN SATURATION: 99 % | DIASTOLIC BLOOD PRESSURE: 84 MMHG | HEIGHT: 65 IN

## 2024-04-16 VITALS — BODY MASS INDEX: 15.48 KG/M2 | WEIGHT: 93 LBS

## 2024-04-16 VITALS — SYSTOLIC BLOOD PRESSURE: 110 MMHG | DIASTOLIC BLOOD PRESSURE: 70 MMHG | BODY MASS INDEX: 15.48 KG/M2 | WEIGHT: 93 LBS

## 2024-04-16 DIAGNOSIS — M81.0 AGE-RELATED OSTEOPOROSIS W/OUT CURRENT PATHOLOGICAL FRACTURE: ICD-10-CM

## 2024-04-16 DIAGNOSIS — E78.5 HYPERLIPIDEMIA, UNSPECIFIED: ICD-10-CM

## 2024-04-16 DIAGNOSIS — E11.9 TYPE 2 DIABETES MELLITUS W/OUT COMPLICATIONS: ICD-10-CM

## 2024-04-16 LAB — HBA1C MFR BLD HPLC: 7.3

## 2024-04-16 PROCEDURE — 83036 HEMOGLOBIN GLYCOSYLATED A1C: CPT | Mod: QW

## 2024-04-16 PROCEDURE — 99214 OFFICE O/P EST MOD 30 MIN: CPT

## 2024-04-17 LAB
CREAT SPEC-SCNC: 55 MG/DL
MICROALBUMIN 24H UR DL<=1MG/L-MCNC: 1.2 MG/DL
MICROALBUMIN/CREAT 24H UR-RTO: NORMAL MG/G

## 2024-04-17 RX ADMIN — ZOLEDRONIC ACID 0 MG/100ML: 5 INJECTION, SOLUTION INTRAVENOUS at 00:00

## 2024-04-17 NOTE — PHYSICAL EXAM
[Alert] : alert [No Acute Distress] : no acute distress [Normal Sclera/Conjunctiva] : normal sclera/conjunctiva [EOMI] : extra ocular movement intact [No LAD] : no lymphadenopathy [Thyroid Not Enlarged] : the thyroid was not enlarged [No Thyroid Nodules] : no palpable thyroid nodules [No Respiratory Distress] : no respiratory distress [Clear to Auscultation] : lungs were clear to auscultation bilaterally [Normal S1, S2] : normal S1 and S2 [Regular Rhythm] : with a regular rhythm [No Edema] : no peripheral edema [Normal Bowel Sounds] : normal bowel sounds [Not Tender] : non-tender [Not Distended] : not distended [Soft] : abdomen soft [Normal Anterior Cervical Nodes] : no anterior cervical lymphadenopathy [No Spinal Tenderness] : no spinal tenderness [No Stigmata of Cushings Syndrome] : no stigmata of Cushings Syndrome [No Clubbing, Cyanosis] : no clubbing  or cyanosis of the fingernails [No Rash] : no rash [Right foot was examined, including] : right foot ~C was examined, including visual inspection with sensory and pulse exams [Left foot was examined, including] : left foot ~C was examined, including visual inspection with sensory and pulse exams [Normal] : normal [2+] : 2+ in the dorsalis pedis [Diminished Throughout Both Feet] : normal tactile sensation with monofilament testing throughout both feet [Normal Reflexes] : deep tendon reflexes were 2+ and symmetric [Normal Affect] : the affect was normal [Normal Mood] : the mood was normal [FreeTextEntry1] : bunion [FreeTextEntry5] : bunion [Kyphosis] : no kyphosis present [Acanthosis Nigricans] : no acanthosis nigricans

## 2024-04-17 NOTE — REVIEW OF SYSTEMS
[Fatigue] : fatigue [Recent Weight Loss (___ Lbs)] : recent weight loss: [unfilled] lbs [Back Pain] : back pain [Poor Balance] : poor balance [Negative] : Genitourinary [Recent Weight Gain (___ Lbs)] : no recent weight gain [Hair Loss] : no hair loss [Pain/Numbness of Digits] : no pain/numbness of digits [Polydipsia] : no polydipsia [Cold Intolerance] : no cold intolerance [Heat Intolerance] : no heat intolerance [Easy Bruising] : no tendency for easy bruising [Swelling] : no swelling [FreeTextEntry7] : BMs are variable but more loose [de-identified] : less dry skin

## 2024-04-17 NOTE — ASSESSMENT
[Diabetes Foot Care] : diabetes foot care [Long Term Vascular Complications] : long term vascular complications of diabetes [Carbohydrate Consistent Diet] : carbohydrate consistent diet [Importance of Diet and Exercise] : importance of diet and exercise to improve glycemic control, achieve weight loss and improve cardiovascular health [Self Monitoring of Blood Glucose] : self monitoring of blood glucose [Bisphosphonate Therapy] : Risks and benefits of bisphosphonate therapy were  discussed with the patient including gastroesophageal irritation, osteonecrosis of the jaw, and atypical femur fractures, and acute phase reaction [FreeTextEntry1] : 63 y.o. female with h/o Type 2 DM, hyperlipidemia and fatigue.  1. Type 2 DM- Good control with Hba1c of 7.3% today. Discussed pathophysiology. Reviewed blood glucose and Hba1c goals. Encouraged a carbohydrate consistent diet and exercise. For now, will continue Metformin 1,000 mg BID. Discussed adding a GLP-1 agonist or a SGLT-2 inhibitor if diabetes control worsens. Did evaluate for Type 1 DM in 1/2022 with a normal c peptide, anti-FRED, islet antibody and zinc transporter ab. Discussed importance of yearly eye exam and foot care. Normal CMP and urine alb/cr ratio in August 2023.   2. BP is at goal and will monitor for now  3. Hyperlipidemia- Encouraged a low-fat diet and exercise. UTD with lipids. Will continue Rosuvastatin 5 mg daily.  4. Osteoporosis- DEXA scan done on 4/9/24 was reviewed. Patient is at increased risk of fracture. Recommend medical therapy and s/p IV Reclast infusion in February 2023. Will proceed with IV Reclast this year. Encouraged weight bearing activity. Discussed appropriate calcium and vitamin D intake. Will repeat DEXA scan in 2025.   Follow up in 4 to 6 months.

## 2024-04-17 NOTE — HISTORY OF PRESENT ILLNESS
[FreeTextEntry1] : 63 y.o. female with h/o Type 2 DM diagnosed in 2015, hyperlipidemia and osteoporosis presents for follow up visit.   Reports getting sick on 2/20/24 and had symptoms of COVID-19 but tested negative. Then had weight loss with GI symptoms. She did see GI specialist.   Had EGD, colonoscopy and CT scan with no abnormal findings.   She does have a One Touch glucometer and am FS once weekly are 110s to 130.   UTD with ophthalmology (May 2023) and no retinopathy. No kidney disease. No neuropathy and did see neurology in the past. Self-care of feet but does have podiatrist in case of emergency. Reports restless legs.   Currently taking Metformin 1,000 mg BID. Never met with RD. UTD with colonoscopy.   Diet: Breakfast: yogurt Lunch: fruit Dinner: starch, protein with salad (pork chop, baked potato and vegetable) Drinks: water, tea Snacks: likes sweets and sometimes has popcorn  She does keep active with grandkids but no exercise.   Also has bulging cervical disc with decrease in balance.   Takes MVI and iron   Does c/o fatigue. Weight is down. No polyuria or polydipsia. No SOB or CP.   In regard to hyperlipidemia, she takes Rosuvastatin 5 mg daily.   She does have family history of autoimmune disease including her sister with Type 1 DM and thyroid disease.   In regard to osteoporosis, no falls or fractures. No calcium or vitamin D supplement. She does eat yogurt and some leafy greens. UTD with dentist and will need root canal.   S/p IV Reclast infusion on 2/22/23  DEXA scan on 3/25/22 shows spine -3.0, left femoral neck -1.6 and total hip -1.8.   DEXA scan on 4/9/24 shows spine -2.7, left femoral neck -1.5 and total hip -1.8.

## 2024-04-22 ENCOUNTER — TRANSCRIPTION ENCOUNTER (OUTPATIENT)
Age: 64
End: 2024-04-22

## 2024-05-20 ENCOUNTER — OFFICE (OUTPATIENT)
Dept: URBAN - METROPOLITAN AREA CLINIC 35 | Facility: CLINIC | Age: 64
Setting detail: OPHTHALMOLOGY
End: 2024-05-20
Payer: COMMERCIAL

## 2024-05-20 DIAGNOSIS — H01.002: ICD-10-CM

## 2024-05-20 DIAGNOSIS — H16.223: ICD-10-CM

## 2024-05-20 DIAGNOSIS — H35.3131: ICD-10-CM

## 2024-05-20 DIAGNOSIS — H02.831: ICD-10-CM

## 2024-05-20 DIAGNOSIS — H01.005: ICD-10-CM

## 2024-05-20 DIAGNOSIS — E11.9: ICD-10-CM

## 2024-05-20 DIAGNOSIS — H25.13: ICD-10-CM

## 2024-05-20 DIAGNOSIS — H02.834: ICD-10-CM

## 2024-05-20 DIAGNOSIS — H43.391: ICD-10-CM

## 2024-05-20 PROBLEM — H52.223 ASTIGMATISM, REGULAR; BOTH EYES: Status: ACTIVE | Noted: 2024-05-20

## 2024-05-20 PROBLEM — H52.13 MYOPIA; BOTH EYES: Status: ACTIVE | Noted: 2024-05-20

## 2024-05-20 PROCEDURE — 92250 FUNDUS PHOTOGRAPHY W/I&R: CPT | Performed by: OPHTHALMOLOGY

## 2024-05-20 PROCEDURE — 92014 COMPRE OPH EXAM EST PT 1/>: CPT | Performed by: OPHTHALMOLOGY

## 2024-05-20 ASSESSMENT — CONFRONTATIONAL VISUAL FIELD TEST (CVF)
OS_FINDINGS: FULL
OD_FINDINGS: FULL

## 2024-05-20 ASSESSMENT — LID EXAM ASSESSMENTS
OS_BLEPHARITIS: 1+
OD_BLEPHARITIS: 1+

## 2024-05-20 ASSESSMENT — LID POSITION - DERMATOCHALASIS
OS_DERMATOCHALASIS: ABSENT
OD_DERMATOCHALASIS: ABSENT

## 2024-05-20 ASSESSMENT — LID POSITION - COMMENTS: OD_COMMENTS: INCISIONS INTACT

## 2024-06-11 ENCOUNTER — APPOINTMENT (OUTPATIENT)
Dept: RHEUMATOLOGY | Facility: CLINIC | Age: 64
End: 2024-06-11
Payer: COMMERCIAL

## 2024-06-11 VITALS
RESPIRATION RATE: 16 BRPM | SYSTOLIC BLOOD PRESSURE: 103 MMHG | OXYGEN SATURATION: 100 % | HEART RATE: 78 BPM | DIASTOLIC BLOOD PRESSURE: 69 MMHG

## 2024-06-11 VITALS
HEART RATE: 78 BPM | OXYGEN SATURATION: 98 % | SYSTOLIC BLOOD PRESSURE: 109 MMHG | TEMPERATURE: 97.5 F | DIASTOLIC BLOOD PRESSURE: 78 MMHG | RESPIRATION RATE: 16 BRPM

## 2024-06-11 PROCEDURE — 96374 THER/PROPH/DIAG INJ IV PUSH: CPT | Mod: 59

## 2024-06-11 RX ORDER — ZOLEDRONIC ACID 5 MG/100ML
5 INJECTION INTRAVENOUS
Qty: 0 | Refills: 0 | Status: COMPLETED | OUTPATIENT
Start: 2024-04-17

## 2024-06-11 NOTE — HISTORY OF PRESENT ILLNESS
[Denies] : Denies [No] : No [N/A] : N/A [Yes] : Yes [Right upper extremity] : Right upper extremity [24g] : 24g [Start Time: ___] : Medication Start Time: [unfilled] [End Time: ___] : Medication End Time: [unfilled] [Medication Name: ___] : Medication Name: [unfilled] [Total Amount Administered: ___] : Total Amount Administered: [unfilled] [IV discontinued. Intact. No signs or symptoms of IV complications noted. Time: ___] : IV discontinued. Intact. No signs or symptoms of IV complications noted. Time: [unfilled] [Patient  instructed to seek medical attention with signs and symptoms of adverse effects] : Patient  instructed to seek medical attention with signs and symptoms of adverse effects [Patient left unit in no acute distress] : Patient left unit in no acute distress [Medications administered as ordered and tolerated well.] : Medications administered as ordered and tolerated well. [Informed consent documented in EHR.] : Informed consent documented in EHR. [de-identified] : Patient presents for Zoledronic Acid infusion today, ambulatory in NAD. VSS. Discharge instructions reviewed with patient at the chairside, patient verbalized understanding. Patient tolerated infusion well. Patient ambulated independently off the unit upon medication completion.

## 2024-06-20 ENCOUNTER — RX RENEWAL (OUTPATIENT)
Age: 64
End: 2024-06-20

## 2024-06-20 RX ORDER — ROSUVASTATIN CALCIUM 5 MG/1
5 TABLET, FILM COATED ORAL
Qty: 90 | Refills: 3 | Status: ACTIVE | COMMUNITY
Start: 2022-01-25 | End: 1900-01-01

## 2024-06-24 ENCOUNTER — TRANSCRIPTION ENCOUNTER (OUTPATIENT)
Age: 64
End: 2024-06-24

## 2024-06-24 RX ORDER — METFORMIN HYDROCHLORIDE 1000 MG/1
1000 TABLET, COATED ORAL TWICE DAILY
Qty: 180 | Refills: 1 | Status: ACTIVE | COMMUNITY
Start: 2022-01-25 | End: 1900-01-01

## 2024-09-11 ENCOUNTER — TRANSCRIPTION ENCOUNTER (OUTPATIENT)
Age: 64
End: 2024-09-11

## 2024-11-06 ENCOUNTER — APPOINTMENT (OUTPATIENT)
Dept: ENDOCRINOLOGY | Facility: CLINIC | Age: 64
End: 2024-11-06
Payer: COMMERCIAL

## 2024-11-06 VITALS
HEIGHT: 65 IN | SYSTOLIC BLOOD PRESSURE: 118 MMHG | WEIGHT: 105.38 LBS | BODY MASS INDEX: 17.56 KG/M2 | DIASTOLIC BLOOD PRESSURE: 70 MMHG

## 2024-11-06 DIAGNOSIS — E78.5 HYPERLIPIDEMIA, UNSPECIFIED: ICD-10-CM

## 2024-11-06 DIAGNOSIS — M81.0 AGE-RELATED OSTEOPOROSIS W/OUT CURRENT PATHOLOGICAL FRACTURE: ICD-10-CM

## 2024-11-06 DIAGNOSIS — E11.9 TYPE 2 DIABETES MELLITUS W/OUT COMPLICATIONS: ICD-10-CM

## 2024-11-06 LAB — HBA1C MFR BLD HPLC: 7.4

## 2024-11-06 PROCEDURE — 83036 HEMOGLOBIN GLYCOSYLATED A1C: CPT | Mod: QW

## 2024-11-06 PROCEDURE — 99214 OFFICE O/P EST MOD 30 MIN: CPT

## 2024-11-06 RX ORDER — SEMAGLUTIDE 0.68 MG/ML
2 INJECTION, SOLUTION SUBCUTANEOUS
Qty: 3 | Refills: 5 | Status: ACTIVE | COMMUNITY
Start: 2024-11-06 | End: 1900-01-01

## 2024-11-08 ENCOUNTER — DOCTOR'S OFFICE (OUTPATIENT)
Facility: LOCATION | Age: 64
Setting detail: OPHTHALMOLOGY
End: 2024-11-08
Payer: COMMERCIAL

## 2024-11-08 LAB
ALBUMIN SERPL ELPH-MCNC: 4.6 G/DL
ALP BLD-CCNC: 53 U/L
ALT SERPL-CCNC: 21 U/L
ANION GAP SERPL CALC-SCNC: 13 MMOL/L
AST SERPL-CCNC: 25 U/L
BILIRUB SERPL-MCNC: 0.3 MG/DL
BUN SERPL-MCNC: 11 MG/DL
CALCIUM SERPL-MCNC: 9.8 MG/DL
CHLORIDE SERPL-SCNC: 100 MMOL/L
CHOLEST SERPL-MCNC: 158 MG/DL
CO2 SERPL-SCNC: 26 MMOL/L
CREAT SERPL-MCNC: 0.7 MG/DL
CREAT SPEC-SCNC: 55 MG/DL
EGFR: 98 ML/MIN/1.73M2
FOLATE SERPL-MCNC: >20 NG/ML
GLUCOSE SERPL-MCNC: 136 MG/DL
HCT VFR BLD CALC: 38.9 %
HDLC SERPL-MCNC: 70 MG/DL
HGB BLD-MCNC: 11.9 G/DL
LDLC SERPL CALC-MCNC: 49 MG/DL
MCHC RBC-ENTMCNC: 29.2 PG
MCHC RBC-ENTMCNC: 30.6 G/DL
MCV RBC AUTO: 95.3 FL
MICROALBUMIN 24H UR DL<=1MG/L-MCNC: 1.8 MG/DL
MICROALBUMIN/CREAT 24H UR-RTO: 32 MG/G
NONHDLC SERPL-MCNC: 88 MG/DL
PLATELET # BLD AUTO: 348 K/UL
POTASSIUM SERPL-SCNC: 4.9 MMOL/L
PROT SERPL-MCNC: 6.8 G/DL
RBC # BLD: 4.08 M/UL
RBC # FLD: 12.6 %
SODIUM SERPL-SCNC: 138 MMOL/L
TRIGL SERPL-MCNC: 255 MG/DL
TSH SERPL-ACNC: 1.71 UIU/ML
VIT B12 SERPL-MCNC: 328 PG/ML
WBC # FLD AUTO: 5.96 K/UL

## 2024-11-08 PROCEDURE — RESTYLANE RESTYLANE: Performed by: OPHTHALMOLOGY

## 2024-11-08 ASSESSMENT — KERATOMETRY
OS_K1POWER_DIOPTERS: 48.25
OD_AXISANGLE_DEGREES: 163
METHOD_AUTO_MANUAL: AUTO
OD_K2POWER_DIOPTERS: 47.75
OD_K1POWER_DIOPTERS: 46.75
OS_K2POWER_DIOPTERS: 493.00
OS_AXISANGLE_DEGREES: 064

## 2024-11-08 ASSESSMENT — REFRACTION_AUTOREFRACTION
OS_CYLINDER: -0.50
OD_AXIS: 080
OD_SPHERE: -1.50
OS_AXIS: 152
OD_CYLINDER: -1.25
OS_SPHERE: -3.50

## 2024-11-08 ASSESSMENT — VISUAL ACUITY
OD_BCVA: 20/20+2
OS_BCVA: 20/20-1

## 2025-03-14 ENCOUNTER — RX RENEWAL (OUTPATIENT)
Age: 65
End: 2025-03-14

## 2025-04-07 ENCOUNTER — APPOINTMENT (OUTPATIENT)
Dept: ENDOCRINOLOGY | Facility: CLINIC | Age: 65
End: 2025-04-07

## 2025-05-09 ENCOUNTER — APPOINTMENT (OUTPATIENT)
Dept: ENDOCRINOLOGY | Facility: CLINIC | Age: 65
End: 2025-05-09
Payer: COMMERCIAL

## 2025-05-09 ENCOUNTER — RESULT CHARGE (OUTPATIENT)
Age: 65
End: 2025-05-09

## 2025-05-09 VITALS
DIASTOLIC BLOOD PRESSURE: 77 MMHG | HEIGHT: 65 IN | OXYGEN SATURATION: 96 % | BODY MASS INDEX: 16.68 KG/M2 | RESPIRATION RATE: 16 BRPM | WEIGHT: 100.13 LBS | HEART RATE: 90 BPM | TEMPERATURE: 97.8 F | SYSTOLIC BLOOD PRESSURE: 122 MMHG

## 2025-05-09 DIAGNOSIS — E11.9 TYPE 2 DIABETES MELLITUS W/OUT COMPLICATIONS: ICD-10-CM

## 2025-05-09 DIAGNOSIS — M81.0 AGE-RELATED OSTEOPOROSIS W/OUT CURRENT PATHOLOGICAL FRACTURE: ICD-10-CM

## 2025-05-09 DIAGNOSIS — E78.5 HYPERLIPIDEMIA, UNSPECIFIED: ICD-10-CM

## 2025-05-09 PROCEDURE — 83036 HEMOGLOBIN GLYCOSYLATED A1C: CPT | Mod: QW

## 2025-05-09 PROCEDURE — 99214 OFFICE O/P EST MOD 30 MIN: CPT

## 2025-05-10 ENCOUNTER — NON-APPOINTMENT (OUTPATIENT)
Age: 65
End: 2025-05-10

## 2025-05-12 ENCOUNTER — TRANSCRIPTION ENCOUNTER (OUTPATIENT)
Age: 65
End: 2025-05-12

## 2025-05-12 LAB
ALBUMIN SERPL ELPH-MCNC: 4.4 G/DL
ALP BLD-CCNC: 52 U/L
ALT SERPL-CCNC: 13 U/L
ANION GAP SERPL CALC-SCNC: 18 MMOL/L
AST SERPL-CCNC: 20 U/L
BILIRUB SERPL-MCNC: 0.3 MG/DL
BUN SERPL-MCNC: 10 MG/DL
CALCIUM SERPL-MCNC: 9.9 MG/DL
CHLORIDE SERPL-SCNC: 104 MMOL/L
CHOLEST SERPL-MCNC: 152 MG/DL
CO2 SERPL-SCNC: 21 MMOL/L
CREAT SERPL-MCNC: 0.71 MG/DL
CREAT SPEC-SCNC: 114 MG/DL
EGFRCR SERPLBLD CKD-EPI 2021: 96 ML/MIN/1.73M2
GLUCOSE SERPL-MCNC: 108 MG/DL
HCT VFR BLD CALC: 37 %
HDLC SERPL-MCNC: 75 MG/DL
HGB BLD-MCNC: 11.8 G/DL
LDLC SERPL-MCNC: 55 MG/DL
MCHC RBC-ENTMCNC: 28.9 PG
MCHC RBC-ENTMCNC: 31.9 G/DL
MCV RBC AUTO: 90.5 FL
MICROALBUMIN 24H UR DL<=1MG/L-MCNC: 2 MG/DL
MICROALBUMIN/CREAT 24H UR-RTO: 17 MG/G
NONHDLC SERPL-MCNC: 78 MG/DL
PLATELET # BLD AUTO: 334 K/UL
POTASSIUM SERPL-SCNC: 6.3 MMOL/L
PROT SERPL-MCNC: 6.7 G/DL
RBC # BLD: 4.09 M/UL
RBC # FLD: 12.9 %
SODIUM SERPL-SCNC: 143 MMOL/L
TRIGL SERPL-MCNC: 135 MG/DL
TSH SERPL-ACNC: 0.76 UIU/ML
WBC # FLD AUTO: 6.32 K/UL

## 2025-05-13 LAB — HBA1C MFR BLD HPLC: 6.7

## 2025-05-22 ENCOUNTER — TRANSCRIPTION ENCOUNTER (OUTPATIENT)
Age: 65
End: 2025-05-22

## 2025-05-23 LAB
ANION GAP SERPL CALC-SCNC: 20 MMOL/L
BUN SERPL-MCNC: 13 MG/DL
CALCIUM SERPL-MCNC: 10.2 MG/DL
CHLORIDE SERPL-SCNC: 99 MMOL/L
CO2 SERPL-SCNC: 20 MMOL/L
CREAT SERPL-MCNC: 0.78 MG/DL
EGFRCR SERPLBLD CKD-EPI 2021: 86 ML/MIN/1.73M2
GLUCOSE SERPL-MCNC: 124 MG/DL
POTASSIUM SERPL-SCNC: 5.7 MMOL/L
SODIUM SERPL-SCNC: 139 MMOL/L

## 2025-06-24 ENCOUNTER — DOCTOR'S OFFICE (OUTPATIENT)
Facility: LOCATION | Age: 65
Setting detail: OPHTHALMOLOGY
End: 2025-06-24
Payer: COMMERCIAL

## 2025-06-24 VITALS — HEIGHT: 55 IN

## 2025-06-24 DIAGNOSIS — H25.13: ICD-10-CM

## 2025-06-24 DIAGNOSIS — H35.3131: ICD-10-CM

## 2025-06-24 DIAGNOSIS — H43.391: ICD-10-CM

## 2025-06-24 DIAGNOSIS — E11.9: ICD-10-CM

## 2025-06-24 PROCEDURE — 92250 FUNDUS PHOTOGRAPHY W/I&R: CPT | Performed by: OPHTHALMOLOGY

## 2025-06-24 PROCEDURE — 92014 COMPRE OPH EXAM EST PT 1/>: CPT | Performed by: OPHTHALMOLOGY

## 2025-06-24 ASSESSMENT — KERATOMETRY
OS_K2POWER_DIOPTERS: 493.00
OS_AXISANGLE_DEGREES: 064
OS_K1POWER_DIOPTERS: 48.25
OD_K2POWER_DIOPTERS: 47.75
OD_AXISANGLE_DEGREES: 163
METHOD_AUTO_MANUAL: AUTO
OD_K1POWER_DIOPTERS: 46.75

## 2025-06-24 ASSESSMENT — REFRACTION_MANIFEST
OD_AXIS: 085
OS_SPHERE: -2.75
OD_VA1: 20/20
OS_CYLINDER: -0.50
OD_CYLINDER: -1.00
OS_VA1: 20/20-2
OS_AXIS: 165
OD_VA1: 20/20-2
OS_SPHERE: -2.75
OD_CYLINDER: -1.25
OD_SPHERE: -1.00
OS_ADD: +2.75
OD_ADD: +2.75
OS_CYLINDER: -0.50
OD_AXIS: 085
OS_VA1: 20/25-
OD_SPHERE: -1.00
OS_AXIS: 165

## 2025-06-24 ASSESSMENT — TONOMETRY
OD_IOP_MMHG: 18
OS_IOP_MMHG: 18

## 2025-06-24 ASSESSMENT — REFRACTION_CURRENTRX
OD_SPHERE: -1.50
OS_OVR_VA: 20/
OD_AXIS: 084
OS_SPHERE: -3.50
OS_AXIS: 158
OD_CYLINDER: -1.25
OD_OVR_VA: 20/
OS_CYLINDER: -0.75

## 2025-06-24 ASSESSMENT — CONFRONTATIONAL VISUAL FIELD TEST (CVF)
OD_FINDINGS: FULL
OS_FINDINGS: FULL

## 2025-06-24 ASSESSMENT — REFRACTION_AUTOREFRACTION
OS_AXIS: 163
OS_CYLINDER: -0.50
OD_AXIS: 084
OS_SPHERE: -3.00
OD_SPHERE: -1.25
OD_CYLINDER: -1.00

## 2025-06-24 ASSESSMENT — VISUAL ACUITY
OD_BCVA: 20/25
OS_BCVA: 20/20-2

## 2025-06-24 ASSESSMENT — LID POSITION - DERMATOCHALASIS
OS_DERMATOCHALASIS: ABSENT
OD_DERMATOCHALASIS: ABSENT

## 2025-06-24 ASSESSMENT — SUPERFICIAL PUNCTATE KERATITIS (SPK)
OD_SPK: T
OS_SPK: T 1+

## 2025-06-24 ASSESSMENT — LID EXAM ASSESSMENTS
OD_BLEPHARITIS: 1+
OS_BLEPHARITIS: 1+

## 2025-06-24 ASSESSMENT — LID POSITION - COMMENTS: OD_COMMENTS: INCISIONS INTACT

## 2025-07-16 ENCOUNTER — TRANSCRIPTION ENCOUNTER (OUTPATIENT)
Age: 65
End: 2025-07-16

## 2025-07-17 ENCOUNTER — TRANSCRIPTION ENCOUNTER (OUTPATIENT)
Age: 65
End: 2025-07-17

## 2025-08-14 ENCOUNTER — TRANSCRIPTION ENCOUNTER (OUTPATIENT)
Age: 65
End: 2025-08-14

## 2025-08-15 ENCOUNTER — TRANSCRIPTION ENCOUNTER (OUTPATIENT)
Age: 65
End: 2025-08-15